# Patient Record
Sex: FEMALE | Race: WHITE
[De-identification: names, ages, dates, MRNs, and addresses within clinical notes are randomized per-mention and may not be internally consistent; named-entity substitution may affect disease eponyms.]

---

## 2017-03-15 ENCOUNTER — HOSPITAL ENCOUNTER (OUTPATIENT)
Dept: HOSPITAL 45 - C.MAMM | Age: 52
Discharge: HOME | End: 2017-03-15
Attending: FAMILY MEDICINE
Payer: COMMERCIAL

## 2017-03-15 DIAGNOSIS — R92.1: Primary | ICD-10-CM

## 2017-03-15 NOTE — MAMMOGRAPHY REPORT
BILATERAL DIGITAL DIAGNOSTIC MAMMOGRAM TOMOSYNTHESIS WITH CAD: 3/15/2017

CLINICAL HISTORY: 6 Month Follow-up Left.  





TECHNIQUE:  Breast tomosynthesis in addition to standard 2D mammography was performed. Current study
 was also evaluated with a Computer Aided Detection (CAD) system.  Bilateral CC and MLO 2-D and nelly
synthesis images and spot magnification left CC and ML views were obtained.  



COMPARISON: Comparison is made to exams dated:  9/14/2016 mammogram, 2/10/2016 mammogram, 2/1/2016 m
ammogram, 1/26/2015 mammogram, 1/23/2014 mammogram, and 1/15/2013 mammogram - [facility].   



BREAST COMPOSITION:  The tissue of both breasts is heterogeneously dense, which may obscure small ma
sses.  



FINDINGS:  

Spot magnification views again demonstrate grouped faint amorphous calcifications in the left 6:00 b
reast posteriorly.  The calcifications are not significantly changed compared to spot magnification 
views dating back to the 2/10/2016 exam.  Additionally, the calcifications are likely also stable co
mpared to the 1/26/2015 full-field views.  Given the long-term stability, the calcifications are lik
ely benign.



The remainder of both breasts are stable compared to prior exams, without suspicious masses, calcifi
cations, or areas of architectural distortion noted.





IMPRESSION:  ACR-BI-RADS CATEGORY 3: PROBABLY BENIGN

Grouped faint calcifications in the left 6:00 breast are not significantly changed dating back to th
e 2/10/2016 exam and are probably benign.  Recommend bilateral diagnostic mammograms in 12 months, t
o confirm 2 years of stability of the calcifications on spot magnification views and for routine ghislaine
mography of the right breast.



The patient has been verbally notified of the results.  





Approximately 10% of breast cancers are not detected with mammography. A negative mammographic repor
t should not delay biopsy if a clinically suggestive mass is present.



Claudette Vega M.D.          

ah/:3/15/2017 14:19:25  



Imaging Technologist: Rebecca BOWEN(RENEE)(M), [facility]

letter sent: Follow Up Recommended 3  

BI-RADS Code: ACR-BI-RADS Category 3: Probably Benign

## 2017-08-30 ENCOUNTER — HOSPITAL ENCOUNTER (OUTPATIENT)
Dept: HOSPITAL 45 - C.GI | Age: 52
Discharge: HOME | End: 2017-08-30
Attending: INTERNAL MEDICINE
Payer: COMMERCIAL

## 2017-08-30 VITALS
WEIGHT: 140.3 LBS | HEIGHT: 65.51 IN | BODY MASS INDEX: 23.09 KG/M2 | BODY MASS INDEX: 23.09 KG/M2 | HEIGHT: 65.51 IN | WEIGHT: 140.3 LBS

## 2017-08-30 VITALS — OXYGEN SATURATION: 97 % | DIASTOLIC BLOOD PRESSURE: 86 MMHG | SYSTOLIC BLOOD PRESSURE: 126 MMHG | HEART RATE: 74 BPM

## 2017-08-30 VITALS — TEMPERATURE: 97.34 F

## 2017-08-30 DIAGNOSIS — Z12.11: Primary | ICD-10-CM

## 2017-08-30 DIAGNOSIS — D12.3: ICD-10-CM

## 2017-08-30 DIAGNOSIS — K64.8: ICD-10-CM

## 2017-08-30 NOTE — DISCHARGE INSTRUCTIONS
Endoscopy Patient Instructions


Date / Procedure(s) Performed


Aug 30, 2017.


Colonoscopy





Allergy Information


Coded Allergies:  


     No Known Allergies (Verified , 8/17/17)





Discharge Date / Findings


Aug 30, 2017.


Colon polyp


Internal hemorrhoids





Medication Instructions


Stopped Medication(s):  


last dose Aldactone Sunday


OK to resume all medications today as prescribed





Reported Home Medications








 Medications  Dose


 Route/Sig


 Max Daily Dose Days Date Category


 


 Biotin 1 Mg Cap  1 Cap


 PO QAM


    8/17/17 Reported


 


 Bee Pollen 550 Mg


 Cap  1 Cap


 PO QAM


    8/17/17 Reported


 


 Aldactone


  (Spironolactone)


 25 Mg Tab  25 Mg


 PO QAM


    8/17/17 Reported


 


 Mobic (Meloxicam)


 15 Mg Tab  15 Mg


 PO DAILY PRN


    8/17/17 Reported


 


 Hydrochlorothiazide


 12.5 Mg Tab  1 Tab


 PO DAILY PRN


   90 8/17/17 Reported


 


 Wellbutrin Sr


  (Bupropion HCl)


 150 Mg Ertab  150 Mg


 PO QAM


    8/17/17 Reported











Provider Instructions





Activity Restrictions





-  No exercising or heavy lifting for 24 hours. 


-  Do not drink alcohol the day of the procedure.


-  Do not drive a car or operate machinery until the day after the procedure.


-  Do not make any important decisions or sign important papers in 24 hours 

after the procedure.





Following Day:





-  Return to full activity which may include returning to work/school.





Diet





Start your diet with liquids and light foods (jello, soup, juice, toast).  Then 

eat your usual diet if not nauseated.





Treatment For Common After Affects





For mild abdominal pain, bloating, or excessive gas:





-  Rest


-  Eat lightly


-  Lie on right side





Follow-Up Information


Follow-up with Dr. Radha Sood as scheduled





Anesthesia Information





What You Should Know





You have had a procedure that required some medicine to reduce anxiety and 

discomfort. This treatment is called moderate sedation.  


After receiving the treatment, you may be sleepy, but you will be able to 

breathe on your own.  The effects of the treatment may last for several hours.








Follow these instructions along with Activity/Diet recommendations noted above:





*  Do NOT do anything where dizziness or clumsiness would be dangerous.





*  Rest quietly at home today, then you can be up and about tomorrow.





*  Have a responsible person stay with you the rest of today.





*  You may have had an I.V. today.  If so, you may take the dressing off later 

today.





Recommendations


 


Call your doctor if:





*  Trouble breathing 





*  Continuous vomiting for more than 24 hours








*  Temperature above 101 degrees





*  Severe abdominal pain or bloating





*  Pain not relieved by pain medicine ordered





*  There is increased drainage or redness from any incision





*  A large amount of rectal bleeding greater than 2-3 tablespoons. 


   (If you had a polyp/s removed or have hemorrhoids, a small amount of blood -


    from the rectum is to be expected.)





*  You have any unanswered questions or concerns.








IN THE EVENT OF A SERIOUS EMERGENCY, GO TO THE NEAREST EMERGENCY ROOM








       Your discharge instructions were prepared by provider Maurizio Sexton.





 Patient Instructions Signature Page














Mary Ann Segal 











Patient (or Guardian) Signature/Date:____________________________________ I 

have read and understand the instructions given to me by my caregivers.








Caregiver/RN/Doctor Signature/Date:____________________________________











The above-named patient and/or guardian has received patient instructions on 

this date.





























+  Original Patient Signature Page (only) stays with chart.  Please make copy 

for patient.

## 2017-08-30 NOTE — GI REPORT
Procedure Date: 8/30/2017 2:06 PM

Procedure:            Colonoscopy

Indications:          Screening for colorectal malignant neoplasm

Medicines:            Monitored Anesthesia Care

Complications:        No immediate complications.

Estimated Blood Loss: Estimated blood loss: none.

Procedure:            Pre-Anesthesia Assessment:

                      - Prior to the procedure, a History and Physical was 

                      performed, and patient medications and allergies were 

                      reviewed. The patient's tolerance of previous 

                      anesthesia was also reviewed. The risks and benefits of 

                      the procedure and the sedation options and risks were 

                      discussed with the patient. All questions were 

                      answered, and informed consent was obtained. Prior 

                      Anticoagulants: The patient has taken no previous 

                      anticoagulant or antiplatelet agents. ASA Grade 

                      Assessment: I - A normal, healthy patient. After 

                      reviewing the risks and benefits, the patient was 

                      deemed in satisfactory condition to undergo the 

                      procedure.

                      After I obtained informed consent, the scope was passed 

                      under direct vision. Throughout the procedure, the 

                      patient's blood pressure, pulse, and oxygen saturations 

                      were monitored continuously. The scope was introduced 

                      through the anus and advanced to the terminal ileum. 

                      The colonoscopy was performed without difficulty. The 

                      patient tolerated the procedure well. The quality of 

                      the bowel preparation was good. The terminal ileum, 

                      ileocecal valve, appendiceal orifice, and rectum were 

                      photographed.

Findings:

     A 5 mm polyp was found in the transverse colon. The polyp was sessile. 

     The polyp was removed with a hot snare. Resection and retrieval were 

     complete.

     Non-bleeding internal hemorrhoids were found during retroflexion. The 

     hemorrhoids were small.

Impression:           - One 5 mm polyp in the transverse colon, removed with 

                      a hot snare. Resected and retrieved.

                      - Non-bleeding internal hemorrhoids.

Recommendation:       - Resume previous diet.

                      - Continue present medications.

                      - Repeat colonoscopy for surveillance based on 

                      pathology results.

                      - Return to primary care physician as previously 

                      scheduled.

Maurizio Sexton DO

8/30/2017 2:43:40 PM

This report has been signed electronically.

Note Initiated On: 8/30/2017 2:06 PM

     I attest to the content of the Intraoperative Record and orders 

     documented therein, exceptions below

## 2017-08-30 NOTE — ANESTHESIOLOGY PROGRESS NOTE
Anesthesia Post Op Note


Date & Time


Aug 30, 2017 at 15:04





Vital Signs


Pain Intensity:  0





Vital Signs Past 12 Hours








  Date Time  Temp Pulse Resp B/P (MAP) Pulse Ox O2 Delivery O2 Flow Rate FiO2


 


8/30/17 14:53  77 18 116/80 (92) 97 Room Air  


 


8/30/17 14:38  76 18 120/72 (88) 98 Room Air  


 


8/30/17 13:14 36.3 85 16 130/77 (94) 96 Room Air  











Notes


Mental Status:  alert / awake / arousable, participated in evaluation


Pt Amnestic to Procedure:  Yes


Nausea / Vomiting:  adequately controlled


Pain:  adequately controlled


Airway Patency, RR, SpO2:  stable & adequate


BP & HR:  stable & adequate


Hydration State:  stable & adequate


Anesthetic Complications:  no major complications apparent

## 2017-08-30 NOTE — ENDO HISTORY AND PHYSICAL
History & Physical


Date of Service:


Aug 30, 2017.


Chief Complaint:


screening


Referring Physician:


Dr. Radha Sood


History of Present Illness


53 yo CF who presents for screening colonoscopy.





Past Surgical History


Hx Cardiac Surgery:  No


Hx Internal Defibrillator:  No


Hx Pacemaker:  No


Hx Abdominal Surgery:  Yes ()


Hx of Implantable Prosthesis:  No


Hx Post-Op Nausea and Vomiting:  No


Hx Cancer Surgery:  No


Hx Thoracic Surgery:  No


Hx Orthopedic:  No


Hx Urinary Tract Surgery:  No





Family History


None





Social History


Smoking Status:  Never Smoker


Hx Substance Use:  No


Hx Alcohol Use:  Yes (OCCASIONALLY)





Allergies


Coded Allergies:  


     No Known Allergies (Verified , 17)





Current Medications





Reported Home Medications








 Medications  Dose


 Route/Sig


 Max Daily Dose Days Date Category


 


 Biotin 1 Mg Cap  1 Cap


 PO QAM


    17 Reported


 


 Bee Pollen 550 Mg


 Cap  1 Cap


 PO QAM


    17 Reported


 


 Aldactone


  (Spironolactone)


 25 Mg Tab  25 Mg


 PO QAM


    17 Reported


 


 Mobic (Meloxicam)


 15 Mg Tab  15 Mg


 PO DAILY PRN


    17 Reported


 


 Hydrochlorothiazide


 12.5 Mg Tab  1 Tab


 PO DAILY PRN


   90 17 Reported


 


 Wellbutrin Sr


  (Bupropion HCl)


 150 Mg Ertab  150 Mg


 PO QAM


    17 Reported











Vital Signs


Weight (Kilograms):  63.64


Height (Feet):  5


Height (Inches):  5.5











  Date Time  Temp Pulse Resp B/P (MAP) Pulse Ox O2 Delivery O2 Flow Rate FiO2


 


17 13:14 36.3 85 16 130/77 (94) 96 Room Air  











Physical Exam


General Appearance:  WD/WN, no apparent distress


Respiratory/Chest:  


   Auscultation:  breath sounds normal


Cardiovascular:  


   Heart Auscultation:  RRR


Abdomen:  


   Bowel Sounds:  normal


   Inspection & Palpation:  soft, non-distended, no tenderness, guarding & 

rebound





Assessment and Plan


Assessment:


53 yo CF who presents for screening colonoscopy.








Plan:


Proceed with colonoscopy.

## 2017-11-28 ENCOUNTER — HOSPITAL ENCOUNTER (OUTPATIENT)
Dept: HOSPITAL 45 - C.PAPS | Age: 52
Discharge: HOME | End: 2017-11-28
Attending: OBSTETRICS & GYNECOLOGY
Payer: COMMERCIAL

## 2017-11-28 DIAGNOSIS — Z12.4: Primary | ICD-10-CM

## 2017-11-28 DIAGNOSIS — R87.610: ICD-10-CM

## 2018-03-21 ENCOUNTER — HOSPITAL ENCOUNTER (OUTPATIENT)
Dept: HOSPITAL 45 - C.MAMM | Age: 53
Discharge: HOME | End: 2018-03-21
Attending: OBSTETRICS & GYNECOLOGY
Payer: COMMERCIAL

## 2018-03-21 DIAGNOSIS — R92.1: Primary | ICD-10-CM

## 2018-03-21 NOTE — MAMMOGRAPHY REPORT
BILATERAL DIGITAL DIAGNOSTIC MAMMOGRAM TOMOSYNTHESIS WITH CAD: 3/21/2018

CLINICAL HISTORY: Interval follow-up of left breast calcifications.  Due for annual mammography of th
e right breast.  The patient reports no current complaints.  





TECHNIQUE:  Breast tomosynthesis in addition to standard 2D mammography was performed. Current study 
was also evaluated with a Computer Aided Detection (CAD) system.  Bilateral CC and MLO 2D and tomosyn
thesis images and spot magnification left CC and ML views were obtained.



COMPARISON: Comparison is made to exams dated:  3/15/2017 mammogram, 9/14/2016 mammogram, 2/10/2016 m
ammogram, 2/1/2016 mammogram, 1/26/2015 mammogram, and 1/23/2014 mammogram - [facility]
nter.   



BREAST COMPOSITION:  The tissue of both breasts is heterogeneously dense, which may obscure small mas
ses.  



FINDINGS:  Spot magnification views again demonstrate grouped faint amorphous calcifications in the l
eft 6:00 breast posteriorly measuring approximately 5 mm.  The calcifications are not significantly c
hanged compared to spot magnification views dating back to the 2/10/2016 exam.  Additionally, the fernando
cifications are likely also stable compared to the 1/26/2015 on full-field views.  Given the long-ter
m stability, the calcifications are considered benign.



The remainder of both breasts are stable compared to prior exams, without suspicious masses, calcific
ations, or areas of architectural distortion noted.  Other scattered benign-appearing left breast fernando
cifications are stable.





IMPRESSION:  ACR BI-RADS CATEGORY 2: BENIGN

Grouped faint calcifications in the left 6:00 breast are stable dating back to at least the February 2016 exam and are considered benign given long-term stability.  There is no mammographic evidence of 
malignancy in either breast. A 1 year screening mammogram is recommended.  



The patient has been verbally notified of the results.  





Approximately 10% of breast cancers are not detected with mammography. A negative mammographic report
 should not delay biopsy if a clinically suggestive mass is present.



Claudette Vega M.D.          

/:3/21/2018 13:10:01  



Imaging Technologist: Rebecca Jeffers, [facility]

letter sent: Normal 1/2  

BI-RADS Code: ACR BI-RADS Category 2: Benign

## 2018-08-06 ENCOUNTER — HOSPITAL ENCOUNTER (OUTPATIENT)
Dept: HOSPITAL 45 - C.LAB1850 | Age: 53
Discharge: HOME | End: 2018-08-06
Attending: INTERNAL MEDICINE
Payer: COMMERCIAL

## 2018-08-06 DIAGNOSIS — Z13.220: ICD-10-CM

## 2018-08-06 DIAGNOSIS — R53.83: Primary | ICD-10-CM

## 2018-08-06 LAB
ALBUMIN SERPL-MCNC: 3.8 GM/DL (ref 3.4–5)
ALP SERPL-CCNC: 70 U/L (ref 45–117)
ALT SERPL-CCNC: 28 U/L (ref 12–78)
AST SERPL-CCNC: 22 U/L (ref 15–37)
BASOPHILS # BLD: 0.04 K/UL (ref 0–0.2)
BASOPHILS NFR BLD: 0.5 %
BUN SERPL-MCNC: 11 MG/DL (ref 7–18)
CALCIUM SERPL-MCNC: 8.8 MG/DL (ref 8.5–10.1)
CO2 SERPL-SCNC: 27 MMOL/L (ref 21–32)
CREAT SERPL-MCNC: 0.9 MG/DL (ref 0.6–1.2)
EOS ABS #: 0.31 K/UL (ref 0–0.5)
EOSINOPHIL NFR BLD AUTO: 281 K/UL (ref 130–400)
GLUCOSE SERPL-MCNC: 84 MG/DL (ref 70–99)
HCT VFR BLD CALC: 47.2 % (ref 37–47)
HGB BLD-MCNC: 15.7 G/DL (ref 12–16)
IG#: 0.01 K/UL (ref 0–0.02)
IMM GRANULOCYTES NFR BLD AUTO: 26.1 %
KETONES UR QL STRIP: 93 MG/DL
LYMPHOCYTES # BLD: 2.17 K/UL (ref 1.2–3.4)
MCH RBC QN AUTO: 30.7 PG (ref 25–34)
MCHC RBC AUTO-ENTMCNC: 33.3 G/DL (ref 32–36)
MCV RBC AUTO: 92.2 FL (ref 80–100)
MONO ABS #: 0.54 K/UL (ref 0.11–0.59)
MONOCYTES NFR BLD: 6.5 %
NEUT ABS #: 5.23 K/UL (ref 1.4–6.5)
NEUTROPHILS # BLD AUTO: 3.7 %
NEUTROPHILS NFR BLD AUTO: 63.1 %
PH UR: 191 MG/DL (ref 0–200)
PMV BLD AUTO: 11.3 FL (ref 7.4–10.4)
POTASSIUM SERPL-SCNC: 3.6 MMOL/L (ref 3.5–5.1)
PROT SERPL-MCNC: 7.5 GM/DL (ref 6.4–8.2)
RED CELL DISTRIBUTION WIDTH CV: 13 % (ref 11.5–14.5)
RED CELL DISTRIBUTION WIDTH SD: 43.6 FL (ref 36.4–46.3)
SODIUM SERPL-SCNC: 139 MMOL/L (ref 136–145)
WBC # BLD AUTO: 8.3 K/UL (ref 4.8–10.8)